# Patient Record
Sex: FEMALE | URBAN - METROPOLITAN AREA
[De-identification: names, ages, dates, MRNs, and addresses within clinical notes are randomized per-mention and may not be internally consistent; named-entity substitution may affect disease eponyms.]

---

## 2023-11-21 ENCOUNTER — HOSPITAL ENCOUNTER (OUTPATIENT)
Dept: TELEMEDICINE | Facility: HOSPITAL | Age: 63
Discharge: HOME | End: 2023-11-21
Payer: MEDICARE

## 2023-11-21 PROCEDURE — 99446 PR INTERPROF, PHONE/INTERNET/EHR, CONSULT, 5-10 MINS: ICD-10-PCS | Mod: ,,, | Performed by: PSYCHIATRY & NEUROLOGY

## 2023-11-21 PROCEDURE — 99446 NTRPROF PH1/NTRNET/EHR 5-10: CPT | Mod: ,,, | Performed by: PSYCHIATRY & NEUROLOGY

## 2023-11-21 NOTE — CONSULTS
[facility]  Vascular Neurology  Comprehensive Stroke Center  TeleVascular Neurology Interprofessional Consult Note    Telestroke Consultation converted to phone consult due to Case characteristics such as timeline or symptoms . Discussed w/ GALILEA GEE at [facility] TELEMEDICINE ED over via phone through [facility].    62F w/ transient aphasia, now resolved.    Imaging personally interpreted:  CT Brain: Not able to be seen because not performed or no images available for view at time of consult due to technical failure  CTA Head & Neck: Not performed at time of consultation    Recommend:  - CTA H&N for vessel imaging ASAP  to r/o high risk attributable lesion for further risk stratification and mgmt  - MRI brain w/o contrast to evaluate for presence and characterization of infarct  - If antiplatelet naive, load aspirin 325 mg & clopidogrel 300 mg x 1 now, followed by daily aspirin 81 mg /  clopidogrel 75 mg x 30 days followed by monotherapy therafter; if already taking asa add plavix with load similar to above or if already on plavix can add aspirin similar to above  - TTE (if < 60 should add bubble study)  - lipid profile and A1c for evaluation of modifiable risk factors  - PT/OT/SLP eval and Rx  - Permissive HTN < 220/120 mmHg x48-72h pending results of vessel imaging    I spent approximately 7 minutes on this encounter which includes chart review, imaging interpretation if available, medical decision making including triage and planning for diagnostics, management and disposition. More than half of that time was spent communicating with the consulting provider and coordinating patient care.    Silvio Ontiveros MD  Vascular Neurology  [facility]    This encounter was conducted as an interprofessional communication between providers at the spoke and vascular neurologist. The interaction was completed over the phone or via secure messaging (electronic medical  record - [facility] Secure Chat).    Once this note was completed, a written copy was sent back to the provider via fax or electronic medical record.

## 2023-11-22 ENCOUNTER — TELEMEDICINE (OUTPATIENT)
Dept: NEUROLOGY | Facility: HOSPITAL | Age: 63
End: 2023-11-22
Payer: MEDICARE

## 2023-11-22 DIAGNOSIS — I10 PRIMARY HYPERTENSION: ICD-10-CM

## 2023-11-22 DIAGNOSIS — R29.90 EPISODE OF TRANSIENT NEUROLOGIC SYMPTOMS: Primary | ICD-10-CM

## 2023-11-22 DIAGNOSIS — I69.30 HISTORY OF STROKE WITH RESIDUAL DEFICIT: ICD-10-CM

## 2023-11-22 DIAGNOSIS — G47.33 OSA (OBSTRUCTIVE SLEEP APNEA): ICD-10-CM

## 2023-11-22 PROCEDURE — G0427 PR INPT TELEHEALTH CON 70/>M: ICD-10-PCS | Mod: 95,G0,, | Performed by: NURSE PRACTITIONER

## 2023-11-22 PROCEDURE — G0427 INPT/ED TELECONSULT70: HCPCS | Mod: 95,G0,, | Performed by: NURSE PRACTITIONER

## 2023-11-22 NOTE — TELEMEDICINE CONSULT
Vascular Neurology  Comprehensive Stroke Center  TeleVascular Neurology Consult Note    Inpatient consult to TeleVascular Neurology  Consult performed by:  Tori Moncada NP  Patient Located: [place]  Consulted by: MARCIE Ivory MD       Assessment/Plan:   Transient Neurological Symptoms  63 y/o female with prior stroke, HTN, JERMAN presented with confusion.  MRI with no acute findings.  CTA with no high-grade stenosis or occlusion.  Symptoms likely related to infectious process (positive for influenza) and/or recrudescence of prior stroke symptoms. Currently back to baseline.  TIA also in differential but less likely given presentation.    Recommendations  -No further stroke workup needed and patient can dispo once medically ready  -Continue home Eliquis as prescribed by her Cardiologist - if AC discontinued should be on ASA, as well as high intensity statin for secondary stroke prevention given prior history of stroke  -Stroke risk factor modification  -Continue to manage infectious process  -Follow up with Neurology as needed     HTN  -Stroke risk factor  -No acute infarct or high-grade stenosis - can resume home medications  -Long term goal < 130/80    JERMAN  -Stroke risk factor  -Continue home CPAP    History of stroke with residual symptoms  -Remote left BG/corona radiata infarct  -Continues to have mild speech difficulty - speech slower than it used to be    NIH Stroke Scale:    Level of Consciousness: 0 - alert  LOC Questions: 0 - answers both correctly  LOC Commands: 0 - performs both correctly  Best Gaze: 0 - normal  Visual: 0 - no visual loss  Facial Palsy: 0 - normal  Motor Left Arm: 0 - no drift  Motor Right Arm: 0 - no drift  Motor Left Le - no drift  Motor Right Le - no drift  Limb Ataxia: 0 - absent  Sensory: 0 - normal  Best Language: 0 - no aphasia  Dysarthria: 0 - normal articulation  Extinction and Inattention: 0 - no neglect  NIH Stroke Scale Total: 0  Modified [place] Scale:  "  Timeline:  Modified Leslie Score: 1 - no significant disability        Thrombolysis Candidate? No, Patient back to neurological baseline     Delays to Thrombolysis?  Not Applicable    Interventional Revascularization Candidate?   Is the patient eligible for mechanical endovascular reperfusion (DEXTER)?  No; No large vessel occlusion identified on imaging     Delays to Thrombectomy? Not Applicable    Hemorrhagic change of an Ischemic Stroke: Does this patient have an ischemic stroke with hemorrhagic changes? No     Subjective:     History of Present Illness:  63 y/o female with prior stroke, HTN, JERMAN presented with complaints of feeling sick - cough, confusion, generalized weakness.     states she decided to take a bath and when she came out she dropped down on the bed naked which is very uncharacteristic for her.  He tried to ask her questions but she could only respond with one or two words.  Patient reports feeling confused.  She was not able to walk because her "feet just wouldn't work."  reports her legs kept trying to give out.  Eventually she was able to walk to the car with assistance from her  to come to the hospital.  Today she feels ill, but does not feel confused and is able to communicate and speak normally.  She denies vision changes, double vision, facial weakness, swallowing difficulty, unilateral weakness or numbness of the arms or legs, dizziness.  Did report HA and feeling a little lightheaded    Of note, patient reports Cardiology placed her on anticoagulation but unclear why.  Does not have known history of afib, DVT, PE.    Medical records faxed for review: H&P, CT head report and images, CTA head and neck report and images, CXR, labs (See Media Tab)  Decision making based only on records sent for review.    Past Medical History:   Diagnosis Date    Hypertension     Obesity, unspecified     Sleep apnea     Stroke        No past surgical history on file.    No family history " on file.    Social History     Tobacco Use    Smoking status: Never   Substance and Sexual Activity    Alcohol use: Never       Current outpatient medications on file.   Eliquis 5mg BID  Flecainide 100mg BID  Lipitor 40ng daily  Oxybutynin 10mg daily      Review of patient's allergies indicates:   Allergen Reactions    Percocet [oxycodone-acetaminophen]          Review of Systems   HENT:  Negative for drooling and trouble swallowing.    Eyes:  Negative for visual disturbance.   Musculoskeletal:  Positive for gait problem.   Neurological:  Positive for speech difficulty, weakness, light-headedness and headaches. Negative for dizziness, facial asymmetry and numbness.   Psychiatric/Behavioral:  Positive for confusion.        Objective:      Physical Exam  Constitutional:       General: She is not in acute distress.  HENT:      Head: Normocephalic.      Right Ear: External ear normal.      Left Ear: External ear normal.      Nose: Nose normal.   Pulmonary:      Effort: Pulmonary effort is normal. No respiratory distress.   Abdominal:      General: There is no distension.      Tenderness: There is no guarding.   Musculoskeletal:      Right lower leg: No edema.      Left lower leg: No edema.   Skin:     General: Skin is dry.   Neurological:      Mental Status: She is alert.   Psychiatric:         Mood and Affect: Mood normal.         Behavior: Behavior normal.       Neurological Exam  LOC: alert  Attention Span: Good   Language: No aphasia  Articulation: No dysarthria  Orientation: Person, Place, Time   Visual Fields: Full  EOM (CN III, IV, VI): Full/intact  Facial Sensation (CN V): Normal  Facial Movement (CN VII): Symmetric facial expression    Motor: Arm left    Full antigravity; Full power noted with nurse assistance  Leg left    Full antigravity; Full power noted with nurse assistance  Arm right    Full antigravity; Full power noted with nurse assistance  Leg right   Full antigravity; Full power noted with nurse  assistance  Cerebellum: No evidence of appendicular or axial ataxia  Sensation: Intact to light touch, temperature and vibration    Diagnostic Results     Brain Imaging   11/21/2023 CT head reviewed and independently interpreted by me.  No early infarct signs.  No hemorrhage.  No mass effect. Remote left BG infarct extending up through corona radiata.    11/22/2023 MRI brain reviewed and independently interpreted by me. No diffusion restriction.  No acute hemorrhage.  Remote left BG/corona radiata infarct.    Vessel Imaging   11/21/2023 CTA head and neck reviewed and independently interpreted by me. No high-grade stenosis or occlusion.    Cardiac Imaging   No report sent for review      Collaborating Physician, Dr. Gonzalez, was available during today's encounter. Any change to plan along with cosign to appear in the EMR.    VIRTUAL TELENOTE    Chief complaint: Confusion  The patient location is: [place]  Present with the patient at the time of the telemed/virtual assessment:  and Nurse     The attending portion of this evaluation, treatment, and documentation was performed per Tori Moncada NP via Telemedicine AudioVisual using the secure [employer] software platform with 2 way audio/video. The provider was located off-site and the patient is located in the hospital. The aforementioned video software was utilized to document the relevant history and physical exam.